# Patient Record
Sex: FEMALE | Race: WHITE | Employment: FULL TIME | URBAN - METROPOLITAN AREA
[De-identification: names, ages, dates, MRNs, and addresses within clinical notes are randomized per-mention and may not be internally consistent; named-entity substitution may affect disease eponyms.]

---

## 2019-02-06 ENCOUNTER — HOSPITAL ENCOUNTER (OUTPATIENT)
Dept: PHYSICAL THERAPY | Age: 44
Discharge: HOME | End: 2019-02-06
Payer: OTHER GOVERNMENT

## 2019-02-06 PROCEDURE — 97161 PT EVAL LOW COMPLEX 20 MIN: CPT

## 2019-02-06 PROCEDURE — 97110 THERAPEUTIC EXERCISES: CPT

## 2019-02-06 NOTE — PROGRESS NOTES
[address] [facility] 
[address] [facility] [address], [place], [address] - Phone: [phone]  Fax: [phone] PLAN OF CARE / STATEMENT OF MEDICAL NECESSITY FOR PHYSICAL THERAPY SERVICES Patient Name: Gerry Osorio : 1975 Medical  
Diagnosis: Left knee pain [M25.562] Right wrist pain [M25.531] Treatment Diagnosis: Left knee pain [M25.562] Right wrist pain [M25.531] Onset Date: Chronic - left knee pain; 1 month ago - right wrist pain Referral Source: Yohana Raygoza MD [facility]): 2019 Prior Hospitalization: See medical history Provider #: [id number] Prior Level of Function: Independent with ADLs, ambulation; working; taking college classes; brief prior episode of PT for left knee pain Comorbidities: Migraine, sleep apnea, tubal ligation, laser eye surgery Medications: Verified on Patient Summary List  
The Plan of Care and following information is based on the information from the initial evaluation.  
=========================================================================================== Assessment / key information:  Patient is a 37 y.o. female who presents with complaints of chronic left knee pain, which began in  and became worse after a fall during which she landed on her left knee in 2018. Additionally, patient reports right wrist pain, which began ~ 1 month ago, shortly after she began taking a college class which required her to do a lot of writing. Patient demonstrates antalgic gait pattern, decreased left knee ROM, decreased left qyad/hamstring flexibility, decreased B hip musculature and left quad musculature strength, and tenderness to palpation around left patella. Patient demonstrates decreased right wrist/hand ROM and strength. Patient would benefit from skilled PT services to address these issues and improve function.   Thank you for this referral. 
 
 FOTO: 49/100 (stage 5, independent community ambulator) - knee; 58/100 - wrist 
========================================================================================== Eval Complexity: History: LOW Complexity : Zero comorbidities / personal factors that will impact the outcome / POCExam:HIGH Complexity : 4+ Standardized tests and measures addressing body structure, function, activity limitation and / or participation in recreation  Presentation: MEDIUM Complexity : Evolving with changing characteristics  Clinical Decision Making:MEDIUM Complexity : FOTO score of 26-74Overall Complexity:MEDIUM Problem List: pain affecting function, decrease ROM, decrease strength, impaired gait/ balance, decrease ADL/ functional abilitiies, decrease activity tolerance, decrease flexibility/ joint mobility and decrease transfer abilities Treatment Plan may include any combination of the following: Therapeutic exercise, Therapeutic activities, Neuromuscular re-education, Physical agent/modality, Gait/balance training, Manual therapy, Aquatic therapy, Patient education, Functional mobility training and Stair training Patient / Family readiness to learn indicated by: asking questions, trying to perform skills and interest 
Persons(s) to be included in education: patient (P) Barriers to Learning/Limitations: None Measures taken:   
Patient Goal (s): \"To relieve some of the pain. \"  
Patient self reported health status: good Rehabilitation Potential: good ? Short Term Goals: To be accomplished in  2  weeks: 1. Patient will demonstrate compliance with HEP. 2. Patient will demonstrate 4/5 B hip ext strength to facilitate functional mobility/gait. 3. Patient will report less than or equal to 4/10 pain at worst to allow increased activity tolerance. ? Long Term Goals: To be accomplished in  4  weeks: 1. Patient will demonstrate independence with HEP.  
2. Patient will demonstrate 5/5 left quad strength to facilitate functional mobility/gait. 3. Patient will demonstrate 5/5 right wrist ext strength to facilitate ADLs. 4. Patient will score greater than or equal to 60/100 on FOTO (knee) to indicate improved function. 5. Patient will score greater than or equal to 63/100 on FOTO (wrist) to indicate improved function. Frequency / Duration:   Patient to be seen  2  times per week for 4-6  weeks: 
Patient / Caregiver education and instruction: activity modification and exercises Therapist Signature: Vidal Peter PT Date: 2/6/2019 Certification Period: NA Time: 6:05 PM  
=========================================================================================== I certify that the above Physical Therapy Services are being furnished while the patient is under my care. I agree with the treatment plan and certify that this therapy is necessary. Physician Signature:       Date:      Time:  Please sign and return to [facility] or you may fax the signed copy to [phone]. Thank you.

## 2019-02-06 NOTE — PROGRESS NOTES
PHYSICAL THERAPY - DAILY TREATMENT NOTE Patient Name: Jewels Ask        Date: 2019 : 1975   YES Patient  Verified Visit #:   1     Insurance: Payor: MARCELINO / Plan: Robb Montgomery 74 / Product Type: Tor Pontiff / In time: 4:05 Out time: 5:00 Total Treatment Time: 54 Medicare/BCBS [place] Time Tracking (below) Total Timed Codes (min):  NA 1:1 Treatment Time:  NA  
TREATMENT AREA =  Left knee, right wrist 
SUBJECTIVE Pain Level (on 0 to 10 scale):  4  / 10 Left medial knee Medication Changes/New allergies or changes in medical history, any new surgeries or procedures? NO    If yes, update Summary List  
Subjective Functional Status/Changes:  []  No changes reported Pt reports left knee pain and swelling, which began in . Pt reports no injury in , just gradual onset of pain. Pt reports that she did have a fall in 2018 while walking down stairs, during which she landed on her left knee. Pt reports increased left knee pain and swelling after fall. Pt reports that she used ice. Pt reports that recently she has begun to notice that she has pain at night as well as during the day. Pt reports prior PT for left knee pain in 2017, but she does not feel that it helped. Pt reports that she did stretching and exercises with t-band. Pt reports that she has been walking in pool on her own and that seems to help, but she does not want to do aquatic therapy right now. Pt states that she would prefer to start with land-based PT and transition to aquatics if needed. Pt reports that she has been referred for MRI, but has not yet scheduled MRI. Pt reports that she does feel clicking when she bends her knee. Pt reports right wrist pain, which began within the past month. Pt reports that writing has become painful. Pt reports that she is taking a college class, and has really noticed it with writing in class.   Pt reports that pain radiates up right medial forearm to elbow. Pt reports that she has tingling fingers medial side B hands (right worse than left). Pt reports that she was given right wrist brace to wear at night and when writing. OBJECTIVE 
A/PROM:   Active Passive Norms Right Left Right Left Shoulder Flex 0-180 [facility] Ext 0-60 WFL WFL    
 abd 0-180 [facility] Horizontal add 0-45 WFL WFL    
 IR 0-70 [facility]    
 ER 0-90 [facility] Elbow Ext/flex 0-150 [facility] Forearm Supination 0-80 75 (p!) 80 (p!) Pronation 0-80 90 (p!) 90 (p!) Wrist Flex 0-80 70 (p!) 70 (p!) Ext 0-70 70 (p!) 70 (p!) Ulnar Dev 0-30 35 (p!) 35 (p!) Radial Dev 0-20 30 (p!) 35 (p!) Strength: MMT  Right Left Shoulder Flex 5/5 5/5 Ext 5/5 5/5  
 abd 5/5 5/5 Horizontal add 5/5 5/5  
 IR 4/5 4/5 ER 4/5 4/5 Elbow Ext/flex 5/5 5/5 Forearm Supination 5/5 5/5 Pronation 5/5 5/5 Wrist Flex 4/5 5/5 Ext 4/5 5/5 Ulnar Dev 4/5 5/5 Radial Dev 4/5 5/5 Hand A/PROM (B) Index Middle Ring Small Thumb MP [facility] WFL [facility]  
PIP [facility] WFL MP  
DIP [facility] WFL IP  
     WFL Ruiz Abd  
     WFL Radial Abd Hand Strength: Gross Grasp 3pt Pinch Lateral Pinch Tip Pinch Right  55, 40, 40 Left 55, 50, 45 C/S ROM WNL without symptoms; Spurling's negative Physical Therapy Evaluation - Knee Gait:  [] Normal    [x] Abnormal    [x] Antalgic    [] NWB    Device: None Describe: Decreased WB/stance time left LE 
 
ROM / Strength 
[] Unable to assess                  AROM                      PROM                   Strength (1-5) Left Right Left Right Left Right Hip Flexion [facility] WFL   5 4 Extension [facility] WFL   4- 4- Abduction [facility] WFL   5 5 Adduction [facility] WFL   NT NT  
Knee Flexion 125 130 125 130 5 5 Extension 0 0 0 0 4 5 Ankle Plantarflexion [facility] WFL 5 5 Dorsiflexion [facility] WFL 5 5 Flexibility: [] Unable to assess at this time Hamstrings: (L) Tightness= [] WNL   [x] Min   [] Mod   [] Severe (R) Tightness= [x] WNL   [] Min   [] Mod   [] Severe Quadriceps: (L) Tightness= [] WNL   [x] Min   [] Mod   [] Severe (R) Tightness= [x] WNL   [] Min   [] Mod   [] Severe Gastroc: (L) Tightness= [x] WNL   [] Min   [] Mod   [] Severe (R) Tightness= [x] WNL   [] Min   [] Mod   [] Severe Other: 
 
Palpation: 
 Neg/Pos  Neg/Pos  Neg/Pos Joint Line Neg Quad tendon Pos Patellar ligament Patella Pos Fibular head Neg Pes Anserinus Tibial tubercle Neg Hamstring tendons Neg Infrapatellar fat pad Optional Tests: 
Patellar Positioning (Static) []L []R Normal []L []R Lateral 
 []L []R Leticia Cavazos      []L []R Medial 
 []L []R [facility] Patellar Tracking 
 []L []R Glide (Lat)   []L []R Tilt (Lat) []L []R Glide (Med)  []L []R Tilt (Med) 
    []L []R Tile (Inf) Patellar Mobility 
 []L []R Hypermobile []L []R Hypomobile Girth Measurements:  
  Cm at  Cm above joint line   Cm at   Cm below joint line  Cm at joint line Left Right Lachmans  [] Neg    [] Pos Posterior Drawer [] Neg    [] Pos Pivot Shift  [] Neg    [] Pos Posterior Sag  [] Neg    [] Pos MAVIS   [] Neg    [] Pos Radha's Test [] Neg    [] Pos ALRI   [] Neg    [] Pos Squat   [] Neg    [] Pos 
Valgus@ 0 Degrees [] Neg    [] Pos Hawa-Sumit [] Neg    [] Pos 
Valgus@ 30 Degrees [] Neg    [] Pos Patellar Apprehension [] Neg    [] Pos 
Varus@ 0 Degrees [] Neg    [] Pos German's Compression [] Neg    [] Pos 
Varus@ 30 Degrees [] Neg    [] Pos Ely's Test  [] Neg    [] Pos Apley's Compression [] Neg    [] Pos Juli's Test  [] Neg    [] Pos Apley's Distraction [] Neg    [] Pos Stroke Test  [] Neg    [] Pos Anterior Drawer [] Neg    [] Pos Fluctuation Test [] Neg    [] Pos Other:                  [] Neg    [] Pos Other tests/comments: 10 min Therapeutic Exercise:  [x]  See flow sheet Rationale:      increase ROM, increase strength and increase proprioception to improve the patients ability to perform ADLs/IADLs, functional mobility and gait safely and independently without increased pain/symptoms During TE min Patient Education:  YES  Reviewed HEP [x]  Progressed/Changed HEP based on:   Initiated HEP (copy in chart) Other Objective/Functional Measures: 
 
See eval 
 
Initiated exercises per flowsheet Post Treatment Pain Level (on 0 to 10) scale:   4  / 10 ASSESSMENT Assessment/Changes in Function:  
See plan of careJustification for Eval Code Complexity: 
Patient History : LOW Examination HIGH - See objective Clinical Presentation: MEDIUM Clinical Decision Making : MEDIUM - FOTO 49/100, 58/100 []  See Progress Note/Recertification Patient will continue to benefit from skilled PT services: see plan of care Progress toward goals / Updated goals: 
See plan of care PLAN [x]  Upgrade activities as tolerated YES Continue plan of care  
[]  Discharge due to :   
[]  Other:   
 
Therapist: Letty Cunningham PT Date: 2/6/2019 Time: 4:07 PM

## 2019-02-13 ENCOUNTER — APPOINTMENT (OUTPATIENT)
Dept: PHYSICAL THERAPY | Age: 44
End: 2019-02-13
Payer: OTHER GOVERNMENT

## 2019-02-15 ENCOUNTER — APPOINTMENT (OUTPATIENT)
Dept: PHYSICAL THERAPY | Age: 44
End: 2019-02-15
Payer: OTHER GOVERNMENT

## 2019-02-22 ENCOUNTER — APPOINTMENT (OUTPATIENT)
Dept: PHYSICAL THERAPY | Age: 44
End: 2019-02-22
Payer: OTHER GOVERNMENT

## 2019-02-27 ENCOUNTER — APPOINTMENT (OUTPATIENT)
Dept: PHYSICAL THERAPY | Age: 44
End: 2019-02-27
Payer: OTHER GOVERNMENT

## 2019-03-28 NOTE — PROGRESS NOTES
[address] [facility] PHYS[facility]ERAP[facility] 
[address] [facility] [address], [place], [address] - Phone: [phone]  Fax: [phone] DISCHARGE SUMMARY Patient Name: Jose Peña : 1975 Treatment/Medical Diagnosis: Left knee pain [M25.562] Right wrist pain [M25.531] Referral Source: Rosemary Odell MD    
Date of Initial Visit: 2019 Attended Visits: 1 Missed Visits: 2 SUMMARY OF TREATMENT Treatment has consisted of initial evaluation (2019) only, during which HEP was initiated. CURRENT STATUS Patient did not return for treatment after initial evaluation. Patient did not show for appointment scheduled for 2019 and cancelled appointment scheduled for 2019. Patient was called regarding rescheduling, and stated that she had started a new job and was not sure if she could take time off for appointments. Patient was advised that if she was unable to reschedule by 3/26/2019, she would be discharged. Patient has not called to reschedule. RECOMMENDATIONS Discontinue therapy due to lack of attendance or compliance. If you have any questions/comments please contact us directly at [phone]. Thank you for allowing us to assist in the care of your patient. Therapist Signature: Marshall Gomez PT Date: 3/28/2019 Time: 12:06 PM  
 
NOTE TO PHYSICIAN:  PLEASE COMPLETE THE ORDERS BELOW AND FAX TO [facility] Physical Therapy: [phone]. If you are unable to process this request in 24 hours please contact our office: [phone]. 
 
___ I have read the above report and request that my patient be discharged from therapy.   
 
Physician Signature:       Date:      Time: